# Patient Record
Sex: FEMALE | Race: WHITE | Employment: FULL TIME | URBAN - METROPOLITAN AREA
[De-identification: names, ages, dates, MRNs, and addresses within clinical notes are randomized per-mention and may not be internally consistent; named-entity substitution may affect disease eponyms.]

---

## 2019-05-12 ENCOUNTER — APPOINTMENT (OUTPATIENT)
Dept: GENERAL RADIOLOGY | Age: 49
End: 2019-05-12

## 2019-05-12 ENCOUNTER — APPOINTMENT (OUTPATIENT)
Dept: CT IMAGING | Age: 49
End: 2019-05-12

## 2019-05-12 ENCOUNTER — HOSPITAL ENCOUNTER (EMERGENCY)
Age: 49
Discharge: HOME | End: 2019-05-13
Attending: EMERGENCY MEDICINE
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
## 2019-05-12 DIAGNOSIS — S30.0XXA CONTUSION OF SACRUM, INITIAL ENCOUNTER: ICD-10-CM

## 2019-05-12 LAB
A/G RATIO: 1.1 (ref 1.1–2.2)
ALBUMIN SERPL-MCNC: 3.9 G/DL (ref 3.4–5)
ALP BLD-CCNC: 72 U/L (ref 40–129)
ALT SERPL-CCNC: 15 U/L (ref 10–40)
ANION GAP SERPL CALCULATED.3IONS-SCNC: 10 MMOL/L (ref 3–16)
AST SERPL-CCNC: 12 U/L (ref 15–37)
BASOPHILS ABSOLUTE: 0.1 K/UL (ref 0–0.2)
BASOPHILS RELATIVE PERCENT: 0.8 %
BILIRUB SERPL-MCNC: <0.2 MG/DL (ref 0–1)
BILIRUBIN URINE: NEGATIVE
BLOOD, URINE: ABNORMAL
BUN BLDV-MCNC: 12 MG/DL (ref 7–20)
CALCIUM SERPL-MCNC: 9.7 MG/DL (ref 8.3–10.6)
CHLORIDE BLD-SCNC: 96 MMOL/L (ref 99–110)
CLARITY: CLEAR
CO2: 28 MMOL/L (ref 21–32)
COLOR: YELLOW
CREAT SERPL-MCNC: 0.9 MG/DL (ref 0.6–1.1)
EOSINOPHILS ABSOLUTE: 0.2 K/UL (ref 0–0.6)
EOSINOPHILS RELATIVE PERCENT: 1.6 %
EPITHELIAL CELLS, UA: ABNORMAL /HPF
GFR AFRICAN AMERICAN: >60
GFR NON-AFRICAN AMERICAN: >60
GLOBULIN: 3.4 G/DL
GLUCOSE BLD-MCNC: 89 MG/DL (ref 70–99)
GLUCOSE URINE: NEGATIVE MG/DL
HCG QUALITATIVE: NEGATIVE
HCT VFR BLD CALC: 45.6 % (ref 36–48)
HEMOGLOBIN: 15.5 G/DL (ref 12–16)
KETONES, URINE: NEGATIVE MG/DL
LEUKOCYTE ESTERASE, URINE: NEGATIVE
LIPASE: 39 U/L (ref 13–60)
LYMPHOCYTES ABSOLUTE: 4.1 K/UL (ref 1–5.1)
LYMPHOCYTES RELATIVE PERCENT: 37.3 %
MCH RBC QN AUTO: 29.7 PG (ref 26–34)
MCHC RBC AUTO-ENTMCNC: 34 G/DL (ref 31–36)
MCV RBC AUTO: 87.4 FL (ref 80–100)
MICROSCOPIC EXAMINATION: YES
MONOCYTES ABSOLUTE: 0.8 K/UL (ref 0–1.3)
MONOCYTES RELATIVE PERCENT: 7.3 %
NEUTROPHILS ABSOLUTE: 5.8 K/UL (ref 1.7–7.7)
NEUTROPHILS RELATIVE PERCENT: 53 %
NITRITE, URINE: NEGATIVE
PDW BLD-RTO: 14.8 % (ref 12.4–15.4)
PH UA: 7 (ref 5–8)
PLATELET # BLD: 252 K/UL (ref 135–450)
PMV BLD AUTO: 7.8 FL (ref 5–10.5)
POTASSIUM SERPL-SCNC: 4 MMOL/L (ref 3.5–5.1)
PROTEIN UA: NEGATIVE MG/DL
RBC # BLD: 5.22 M/UL (ref 4–5.2)
RBC UA: ABNORMAL /HPF (ref 0–2)
SODIUM BLD-SCNC: 134 MMOL/L (ref 136–145)
SPECIFIC GRAVITY UA: <=1.005 (ref 1–1.03)
TOTAL PROTEIN: 7.3 G/DL (ref 6.4–8.2)
URINE TYPE: ABNORMAL
UROBILINOGEN, URINE: 0.2 E.U./DL
WBC # BLD: 10.9 K/UL (ref 4–11)
WBC UA: ABNORMAL /HPF (ref 0–5)

## 2019-05-12 PROCEDURE — 36415 COLL VENOUS BLD VENIPUNCTURE: CPT

## 2019-05-12 PROCEDURE — 2580000003 HC RX 258: Performed by: EMERGENCY MEDICINE

## 2019-05-12 PROCEDURE — 6360000004 HC RX CONTRAST MEDICATION: Performed by: EMERGENCY MEDICINE

## 2019-05-12 PROCEDURE — 85025 COMPLETE CBC W/AUTO DIFF WBC: CPT

## 2019-05-12 PROCEDURE — 84703 CHORIONIC GONADOTROPIN ASSAY: CPT

## 2019-05-12 PROCEDURE — 99284 EMERGENCY DEPT VISIT MOD MDM: CPT

## 2019-05-12 PROCEDURE — 72100 X-RAY EXAM L-S SPINE 2/3 VWS: CPT

## 2019-05-12 PROCEDURE — 71260 CT THORAX DX C+: CPT

## 2019-05-12 PROCEDURE — 74177 CT ABD & PELVIS W/CONTRAST: CPT

## 2019-05-12 PROCEDURE — 81001 URINALYSIS AUTO W/SCOPE: CPT

## 2019-05-12 PROCEDURE — 72220 X-RAY EXAM SACRUM TAILBONE: CPT

## 2019-05-12 PROCEDURE — 80053 COMPREHEN METABOLIC PANEL: CPT

## 2019-05-12 PROCEDURE — 83690 ASSAY OF LIPASE: CPT

## 2019-05-12 PROCEDURE — 6370000000 HC RX 637 (ALT 250 FOR IP): Performed by: NURSE PRACTITIONER

## 2019-05-12 RX ORDER — 0.9 % SODIUM CHLORIDE 0.9 %
1000 INTRAVENOUS SOLUTION INTRAVENOUS ONCE
Status: COMPLETED | OUTPATIENT
Start: 2019-05-12 | End: 2019-05-12

## 2019-05-12 RX ORDER — HYDROCODONE BITARTRATE AND ACETAMINOPHEN 7.5; 325 MG/1; MG/1
1 TABLET ORAL ONCE
Status: COMPLETED | OUTPATIENT
Start: 2019-05-12 | End: 2019-05-12

## 2019-05-12 RX ADMIN — HYDROCODONE BITARTRATE AND ACETAMINOPHEN 1 TABLET: 7.5; 325 TABLET ORAL at 21:44

## 2019-05-12 RX ADMIN — SODIUM CHLORIDE 1000 ML: 9 INJECTION, SOLUTION INTRAVENOUS at 21:58

## 2019-05-12 RX ADMIN — IOPAMIDOL 100 ML: 755 INJECTION, SOLUTION INTRAVENOUS at 23:18

## 2019-05-12 SDOH — HEALTH STABILITY: MENTAL HEALTH: HOW OFTEN DO YOU HAVE A DRINK CONTAINING ALCOHOL?: NEVER

## 2019-05-12 ASSESSMENT — PAIN DESCRIPTION - LOCATION: LOCATION: RIB CAGE

## 2019-05-12 ASSESSMENT — PAIN SCALES - GENERAL
PAINLEVEL_OUTOF10: 9
PAINLEVEL_OUTOF10: 9
PAINLEVEL_OUTOF10: 5

## 2019-05-12 ASSESSMENT — PAIN DESCRIPTION - PAIN TYPE: TYPE: ACUTE PAIN

## 2019-05-12 ASSESSMENT — PAIN DESCRIPTION - ORIENTATION: ORIENTATION: LEFT

## 2019-05-13 VITALS
HEIGHT: 63 IN | WEIGHT: 215 LBS | SYSTOLIC BLOOD PRESSURE: 145 MMHG | RESPIRATION RATE: 18 BRPM | HEART RATE: 89 BPM | TEMPERATURE: 97.9 F | DIASTOLIC BLOOD PRESSURE: 85 MMHG | OXYGEN SATURATION: 96 % | BODY MASS INDEX: 38.09 KG/M2

## 2019-05-13 RX ORDER — IBUPROFEN 800 MG/1
800 TABLET ORAL 2 TIMES DAILY PRN
Qty: 60 TABLET | Refills: 0 | Status: SHIPPED | OUTPATIENT
Start: 2019-05-13 | End: 2020-04-07

## 2019-05-13 NOTE — ED NOTES
Patient back from CT scan. Tolerated well. Ambulated there and back without difficulty.       Smith Gonzalez RN  05/12/19 1928

## 2019-05-13 NOTE — ED PROVIDER NOTES
Abdomen: Nondistended soft tenderness in the left lower and upper quadrant. No ecchymosis, erythema, edema. No rigidity. EXTREMITIES: MAEE. No acute deformities. SKIN: Warm and dry. NEUROLOGICAL: Alert and oriented. Strength is 5/5 in all extremities and sensation is intact. BACK: On exam of cervical, thoracic, lumbar spine, there is no swelling. There is ecchymosis bilaterally to her upper buttocks. There is no midline bony tenderness, without crepitus, deformity, or step off. Patient exhibits tenderness of paraspinal musculature to right and left of midline of the lumbar region. There is no point tenderness over the SI Joint. Patellar reflexes +2. Distal pulses intact. Cap refill < 2 seconds. Sensation intact. Neurovascularly intact. HSNE spine intact. RADIOLOGY  Xr Lumbar Spine (2-3 Views)    Result Date: 5/12/2019  EXAMINATION: 3 XRAY VIEWS OF THE LUMBAR SPINE 5/12/2019 9:33 pm COMPARISON: None. HISTORY: ORDERING SYSTEM PROVIDED HISTORY: Injury TECHNOLOGIST PROVIDED HISTORY: Reason for exam:->Injury Ordering Physician Provided Reason for Exam: Fall (fell down stairs on Thursday. 6 steps mechanical tailbone and left side pain ) FINDINGS: Bones: Five non-rib-bearing lumbar vertebral bodies are present. Vertebral body heights and alignment are maintained. Degenerative changes: No significant degenerative changes. Soft Tissues: Normal soft tissues. No acute lumbar spine abnormality     Xr Sacrum Coccyx (min 2 Views)    Result Date: 5/12/2019  EXAMINATION: 3 XRAY VIEWS OF THE SACRUM/COCCYX 5/12/2019 9:33 pm COMPARISON: Lumbar spine radiographs 05/12/2019, 06/23/2001 (report) HISTORY: ORDERING SYSTEM PROVIDED HISTORY: Injury TECHNOLOGIST PROVIDED HISTORY: Reason for exam:->Injury Ordering Physician Provided Reason for Exam: Fall (fell down stairs on Thursday. 6 steps mechanical tailbone and left side pain ) FINDINGS: Intact sacroiliac joints. Arcuate lines of the sacrum are smooth.   There is a step-off/discontinuity along the coccyx. Hips are intact and in anatomic alignment. Remainder of the bony pelvis is intact. Discontinuity/step-off along the coccyx on lateral view. Correlate with point tenderness. Remainder of the sacrum and sacroiliac joints are otherwise intact. Ct Abdomen Pelvis W Iv Contrast Additional Contrast? None    Negative for acute pulmonary embolism. No acute aortic dissection or aneurysm. Mosaic lung attenuation throughout all 5 lobes (unchanged since 2014) is favored to represent air trapping due to obstructive small airways disease given diffuse airway inflammation. Differential considerations included smoking-related airways disease and hypersensitivity pneumonitis. No acute abdominopelvic findings. No acute osseous abnormality throughout the imaged skeleton. Specifically, intact sacrum and coccyx. Indeterminate right renal cyst.  Recommend follow-up abdominal MRI with and without contrast in 6-12 months. RECOMMENDATIONS: Renal protocol MRI with and without contrast in 6-12 months. Ct Chest Pulmonary Embolism W Contrast    Negative for acute pulmonary embolism. No acute aortic dissection or aneurysm. Mosaic lung attenuation throughout all 5 lobes (unchanged since 2014) is favored to represent air trapping due to obstructive small airways disease given diffuse airway inflammation. Differential considerations included smoking-related airways disease and hypersensitivity pneumonitis. No acute abdominopelvic findings. No acute osseous abnormality throughout the imaged skeleton. Specifically, intact sacrum and coccyx. Indeterminate right renal cyst.  Recommend follow-up abdominal MRI with and without contrast in 6-12 months. RECOMMENDATIONS: Renal protocol MRI with and without contrast in 6-12 months. ED COURSE   I have seen this patient in collaboration with Imtiaz Muller. She presents emergency department for evaluation of coccyx pain after a fall.  CBC and CMP are unremarkable no leukocytosis, anemia, anemia, acute kidney injury. Urinalysis is negative for infection. CT of abdomen and pelvis and chest reveal negative for acute pulmonary embolism. No acute aortic dissection or aneurysm. No acute abdominopelvic findings. No acute osseous abnormality throughout the imaged skeleton. Specifically intact sacrum and coccyx. Patient given Orestes Markham in the emergency department for pain with good relief. I discussed all findings with patient. Patient instructed to follow-up with her primary care physician. Patient instructed to continue ibuprofen and Tylenol as needed for pain. Patient instructed to return to the emergency department with any new or worsening symptoms. Patient is agreeable with plan of care and denies any questions at this time. Patient was sent home with a prescription for ibuprofen.     MDM    Results for orders placed or performed during the hospital encounter of 05/12/19   CBC Auto Differential   Result Value Ref Range    WBC 10.9 4.0 - 11.0 K/uL    RBC 5.22 (H) 4.00 - 5.20 M/uL    Hemoglobin 15.5 12.0 - 16.0 g/dL    Hematocrit 45.6 36.0 - 48.0 %    MCV 87.4 80.0 - 100.0 fL    MCH 29.7 26.0 - 34.0 pg    MCHC 34.0 31.0 - 36.0 g/dL    RDW 14.8 12.4 - 15.4 %    Platelets 174 734 - 087 K/uL    MPV 7.8 5.0 - 10.5 fL    Neutrophils % 53.0 %    Lymphocytes % 37.3 %    Monocytes % 7.3 %    Eosinophils % 1.6 %    Basophils % 0.8 %    Neutrophils # 5.8 1.7 - 7.7 K/uL    Lymphocytes # 4.1 1.0 - 5.1 K/uL    Monocytes # 0.8 0.0 - 1.3 K/uL    Eosinophils # 0.2 0.0 - 0.6 K/uL    Basophils # 0.1 0.0 - 0.2 K/uL   Comprehensive Metabolic Panel   Result Value Ref Range    Sodium 134 (L) 136 - 145 mmol/L    Potassium 4.0 3.5 - 5.1 mmol/L    Chloride 96 (L) 99 - 110 mmol/L    CO2 28 21 - 32 mmol/L    Anion Gap 10 3 - 16    Glucose 89 70 - 99 mg/dL    BUN 12 7 - 20 mg/dL    CREATININE 0.9 0.6 - 1.1 mg/dL    GFR Non-African American >60 >60    GFR  >60 >60 pain, vomiting) that necessitate immediate return. Final Impression    1. Fall, initial encounter    2. Contusion of sacrum, initial encounter        Blood pressure (!) 145/85, pulse 89, temperature 97.9 °F (36.6 °C), temperature source Oral, resp. rate 18, height 5' 3\" (1.6 m), weight 215 lb (97.5 kg), last menstrual period 05/11/2019, SpO2 96 %. DISPOSITION  Patient was discharged to home in good condition.           [address] Jazmine Lopez, APRN - CNP  05/13/19 [id number]

## 2019-05-13 NOTE — ED PROVIDER NOTES
I independently performed a history and physical on Emma Ko. All diagnostic, treatment, and disposition decisions were made by myself in conjunction with the advanced practice provider. For further details of [facility] emergency department encounter, please see advanced practice provider's documentation    This is a 80-year-old female presenting for evaluation after she fell down about 6 steps on Thursday night. She is having some left upper abdominal pain and nausea since the fall. She also has pain in her lower back. Physical examination: Left upper abdominal tenderness to palpation. Xr Lumbar Spine (2-3 Views)    Result Date: 5/12/2019  EXAMINATION: 3 XRAY VIEWS OF THE LUMBAR SPINE 5/12/2019 9:33 pm COMPARISON: None. HISTORY: ORDERING SYSTEM PROVIDED HISTORY: Injury TECHNOLOGIST PROVIDED HISTORY: Reason for exam:->Injury Ordering Physician Provided Reason for Exam: Fall (fell down stairs on Thursday. 6 steps mechanical tailbone and left side pain ) FINDINGS: Bones: Five non-rib-bearing lumbar vertebral bodies are present. Vertebral body heights and alignment are maintained. Degenerative changes: No significant degenerative changes. Soft Tissues: Normal soft tissues. No acute lumbar spine abnormality     Xr Sacrum Coccyx (min 2 Views)    Result Date: 5/12/2019  EXAMINATION: 3 XRAY VIEWS OF THE SACRUM/COCCYX 5/12/2019 9:33 pm COMPARISON: Lumbar spine radiographs 05/12/2019, 06/23/2001 (report) HISTORY: ORDERING SYSTEM PROVIDED HISTORY: Injury TECHNOLOGIST PROVIDED HISTORY: Reason for exam:->Injury Ordering Physician Provided Reason for Exam: Fall (fell down stairs on Thursday. 6 steps mechanical tailbone and left side pain ) FINDINGS: Intact sacroiliac joints. Arcuate lines of the sacrum are smooth. There is a step-off/discontinuity along the coccyx. Hips are intact and in anatomic alignment. Remainder of the bony pelvis is intact.      Discontinuity/step-off along the coccyx on lateral view. Correlate with point tenderness. Remainder of the sacrum and sacroiliac joints are otherwise intact. Ct Abdomen Pelvis W Iv Contrast Additional Contrast? None    Negative for acute pulmonary embolism. No acute aortic dissection or aneurysm. Mosaic lung attenuation throughout all 5 lobes (unchanged since 2014) is favored to represent air trapping due to obstructive small airways disease given diffuse airway inflammation. Differential considerations included smoking-related airways disease and hypersensitivity pneumonitis. No acute abdominopelvic findings. No acute osseous abnormality throughout the imaged skeleton. Specifically, intact sacrum and coccyx. Indeterminate right renal cyst.  Recommend follow-up abdominal MRI with and without contrast in 6-12 months. RECOMMENDATIONS: Renal protocol MRI with and without contrast in 6-12 months. Ct Chest Pulmonary Embolism W Contrast    Negative for acute pulmonary embolism. No acute aortic dissection or aneurysm. Mosaic lung attenuation throughout all 5 lobes (unchanged since 2014) is favored to represent air trapping due to obstructive small airways disease given diffuse airway inflammation. Differential considerations included smoking-related airways disease and hypersensitivity pneumonitis. No acute abdominopelvic findings. No acute osseous abnormality throughout the imaged skeleton. Specifically, intact sacrum and coccyx. Indeterminate right renal cyst.  Recommend follow-up abdominal MRI with and without contrast in 6-12 months. RECOMMENDATIONS: Renal protocol MRI with and without contrast in 6-12 months.         Chris Santana, DO  05/13/19 5753

## 2020-04-07 ENCOUNTER — HOSPITAL ENCOUNTER (EMERGENCY)
Age: 50
Discharge: HOME | End: 2020-04-07
Attending: EMERGENCY MEDICINE
Payer: COMMERCIAL

## 2020-04-07 ENCOUNTER — APPOINTMENT (OUTPATIENT)
Dept: GENERAL RADIOLOGY | Age: 50
End: 2020-04-07
Payer: COMMERCIAL

## 2020-04-07 VITALS
RESPIRATION RATE: 18 BRPM | HEIGHT: 64 IN | SYSTOLIC BLOOD PRESSURE: 128 MMHG | OXYGEN SATURATION: 95 % | WEIGHT: 220 LBS | BODY MASS INDEX: 37.56 KG/M2 | TEMPERATURE: 98.2 F | DIASTOLIC BLOOD PRESSURE: 75 MMHG | HEART RATE: 69 BPM

## 2020-04-07 LAB
A/G RATIO: 1.1 (ref 1.1–2.2)
ALBUMIN SERPL-MCNC: 3.9 G/DL (ref 3.4–5)
ALP BLD-CCNC: 78 U/L (ref 40–129)
ALT SERPL-CCNC: 18 U/L (ref 10–40)
ANION GAP SERPL CALCULATED.3IONS-SCNC: 15 MMOL/L (ref 3–16)
AST SERPL-CCNC: 19 U/L (ref 15–37)
BASOPHILS ABSOLUTE: 0.1 K/UL (ref 0–0.2)
BASOPHILS RELATIVE PERCENT: 0.7 %
BILIRUB SERPL-MCNC: 0.3 MG/DL (ref 0–1)
BUN BLDV-MCNC: 13 MG/DL (ref 7–20)
CALCIUM SERPL-MCNC: 9.6 MG/DL (ref 8.3–10.6)
CHLORIDE BLD-SCNC: 98 MMOL/L (ref 99–110)
CO2: 21 MMOL/L (ref 21–32)
CREAT SERPL-MCNC: 0.6 MG/DL (ref 0.6–1.1)
D DIMER: <200 NG/ML DDU (ref 0–229)
EKG ATRIAL RATE: 75 BPM
EKG ATRIAL RATE: 95 BPM
EKG DIAGNOSIS: NORMAL
EKG DIAGNOSIS: NORMAL
EKG P AXIS: 39 DEGREES
EKG P AXIS: 9 DEGREES
EKG P-R INTERVAL: 146 MS
EKG P-R INTERVAL: 156 MS
EKG Q-T INTERVAL: 348 MS
EKG Q-T INTERVAL: 378 MS
EKG QRS DURATION: 82 MS
EKG QRS DURATION: 92 MS
EKG QTC CALCULATION (BAZETT): 422 MS
EKG QTC CALCULATION (BAZETT): 437 MS
EKG R AXIS: -32 DEGREES
EKG R AXIS: -49 DEGREES
EKG T AXIS: 54 DEGREES
EKG T AXIS: 71 DEGREES
EKG VENTRICULAR RATE: 75 BPM
EKG VENTRICULAR RATE: 95 BPM
EOSINOPHILS ABSOLUTE: 0.1 K/UL (ref 0–0.6)
EOSINOPHILS RELATIVE PERCENT: 1.4 %
GFR AFRICAN AMERICAN: >60
GFR NON-AFRICAN AMERICAN: >60
GLOBULIN: 3.5 G/DL
GLUCOSE BLD-MCNC: 137 MG/DL (ref 70–99)
HCT VFR BLD CALC: 44.9 % (ref 36–48)
HEMOGLOBIN: 14.9 G/DL (ref 12–16)
LIPASE: 30 U/L (ref 13–60)
LYMPHOCYTES ABSOLUTE: 3.1 K/UL (ref 1–5.1)
LYMPHOCYTES RELATIVE PERCENT: 29.2 %
MCH RBC QN AUTO: 27.6 PG (ref 26–34)
MCHC RBC AUTO-ENTMCNC: 33.1 G/DL (ref 31–36)
MCV RBC AUTO: 83.4 FL (ref 80–100)
MONOCYTES ABSOLUTE: 0.9 K/UL (ref 0–1.3)
MONOCYTES RELATIVE PERCENT: 8.1 %
NEUTROPHILS ABSOLUTE: 6.5 K/UL (ref 1.7–7.7)
NEUTROPHILS RELATIVE PERCENT: 60.6 %
PDW BLD-RTO: 17 % (ref 12.4–15.4)
PLATELET # BLD: 242 K/UL (ref 135–450)
PMV BLD AUTO: 7.5 FL (ref 5–10.5)
POTASSIUM SERPL-SCNC: 4.3 MMOL/L (ref 3.5–5.1)
RBC # BLD: 5.39 M/UL (ref 4–5.2)
SODIUM BLD-SCNC: 134 MMOL/L (ref 136–145)
TOTAL PROTEIN: 7.4 G/DL (ref 6.4–8.2)
TROPONIN: <0.01 NG/ML
TROPONIN: <0.01 NG/ML
WBC # BLD: 10.6 K/UL (ref 4–11)

## 2020-04-07 PROCEDURE — 99285 EMERGENCY DEPT VISIT HI MDM: CPT

## 2020-04-07 PROCEDURE — 80053 COMPREHEN METABOLIC PANEL: CPT

## 2020-04-07 PROCEDURE — 85025 COMPLETE CBC W/AUTO DIFF WBC: CPT

## 2020-04-07 PROCEDURE — 85379 FIBRIN DEGRADATION QUANT: CPT

## 2020-04-07 PROCEDURE — 83690 ASSAY OF LIPASE: CPT

## 2020-04-07 PROCEDURE — 71046 X-RAY EXAM CHEST 2 VIEWS: CPT

## 2020-04-07 PROCEDURE — 93010 ELECTROCARDIOGRAM REPORT: CPT | Performed by: INTERNAL MEDICINE

## 2020-04-07 PROCEDURE — 84484 ASSAY OF TROPONIN QUANT: CPT

## 2020-04-07 PROCEDURE — 93005 ELECTROCARDIOGRAM TRACING: CPT | Performed by: EMERGENCY MEDICINE

## 2020-04-07 PROCEDURE — 36415 COLL VENOUS BLD VENIPUNCTURE: CPT

## 2020-04-07 ASSESSMENT — PAIN DESCRIPTION - FREQUENCY: FREQUENCY: INTERMITTENT

## 2020-04-07 ASSESSMENT — PAIN DESCRIPTION - PROGRESSION: CLINICAL_PROGRESSION: RESOLVED

## 2020-04-07 ASSESSMENT — PAIN DESCRIPTION - DESCRIPTORS: DESCRIPTORS: ACHING

## 2020-04-07 ASSESSMENT — ENCOUNTER SYMPTOMS
ABDOMINAL PAIN: 0
SHORTNESS OF BREATH: 0
VOMITING: 0

## 2020-04-07 ASSESSMENT — PAIN SCALES - GENERAL: PAINLEVEL_OUTOF10: 3

## 2020-04-08 ENCOUNTER — OFFICE VISIT (OUTPATIENT)
Dept: CARDIOLOGY CLINIC | Age: 50
End: 2020-04-08
Payer: COMMERCIAL

## 2020-04-08 ENCOUNTER — TELEPHONE (OUTPATIENT)
Dept: CARDIOLOGY CLINIC | Age: 50
End: 2020-04-08

## 2020-04-08 ENCOUNTER — CARE COORDINATION (OUTPATIENT)
Dept: CARE COORDINATION | Age: 50
End: 2020-04-08

## 2020-04-08 VITALS
DIASTOLIC BLOOD PRESSURE: 62 MMHG | WEIGHT: 230 LBS | OXYGEN SATURATION: 96 % | HEIGHT: 63 IN | SYSTOLIC BLOOD PRESSURE: 130 MMHG | HEART RATE: 95 BPM | BODY MASS INDEX: 40.75 KG/M2

## 2020-04-08 PROBLEM — R07.89 OTHER CHEST PAIN: Status: ACTIVE | Noted: 2020-04-08

## 2020-04-08 PROBLEM — R06.02 SOB (SHORTNESS OF BREATH): Status: ACTIVE | Noted: 2020-04-08

## 2020-04-08 PROBLEM — I10 ESSENTIAL HYPERTENSION: Status: ACTIVE | Noted: 2020-04-08

## 2020-04-08 PROCEDURE — 99204 OFFICE O/P NEW MOD 45 MIN: CPT | Performed by: INTERNAL MEDICINE

## 2020-04-08 NOTE — PATIENT INSTRUCTIONS
Patient Plan:  1. Recommend checking your blood pressure twice a day. (noon and evening)  Sit, relax for 5 minutes or so and take your blood pressure. Keep a log of your numbers. 2. Echocardiogram to view size and strength of the heart   3. Stress Test to risk stratify  4. We will call you with the results of the echocardiogram  5. Smoking cessation discussed  6.  Virtual Visit in 1 month

## 2020-04-16 ENCOUNTER — HOSPITAL ENCOUNTER (OUTPATIENT)
Dept: NON INVASIVE DIAGNOSTICS | Age: 50
Discharge: HOME | End: 2020-04-16
Payer: COMMERCIAL

## 2020-04-16 LAB
LV EF: 55 %
LVEF MODALITY: NORMAL

## 2020-04-16 PROCEDURE — 93017 CV STRESS TEST TRACING ONLY: CPT

## 2020-04-16 PROCEDURE — 93306 TTE W/DOPPLER COMPLETE: CPT

## 2020-05-07 ENCOUNTER — TELEPHONE (OUTPATIENT)
Dept: CARDIOLOGY CLINIC | Age: 50
End: 2020-05-07

## 2020-05-07 NOTE — TELEPHONE ENCOUNTER
Spoke with patient. Voiced understanding. Will proceed with a cardiac CT. Order placed and [place] will call to schedule.

## 2020-05-07 NOTE — TELEPHONE ENCOUNTER
----- Message from Arlette Coates sent at 4/17/2020  3:03 PM EDT -----  Wanted RN to call since further testing is required. ----- Message -----  From: Angela Haas MD  Sent: 4/17/2020   7:45 AM EDT  To: [facility]    Let pt know that stress test showed no ischemia but technically incomplete as did not hit target HR (mostly due to HTN that occurred and limited study)/ I would get a cardiac CTA to eval further.

## 2020-05-12 NOTE — PROGRESS NOTES
[address] [facility]  VIRTUAL VISIT  (During RFGOQ-15 public health emergency)  Cardiovascular Office Note        PATIENT: Silke Rivera  DATE: 2020  MRN: <[id number]>  CSN: [id number]  : 1970      Primary Care Doctor: No primary care provider on file. Reason for evaluation:   1 Month Follow-Up      Subjective:   History of present illness on initial date of evaluation:   Silke Rivera is a 52 y.o. patient who presented referred by [address] [place] [address] ED with c/o of chest pain. Patient presented to the ED on 2020 with c/o chest pain. EKG demonstrated NSR, HR 75, left axis deviation. Troponin's negative x 2. Today she reports she has been having spikes in her BP. She states yesterday it was 179/112 and proceeded to the ED. She is not sure why her BP is up, other than being stressed due to the COVID-19. She states she has been eating better since the COVID-19. She states the chest pain started yesterday along with the high BP. She states the pain is left side, sharp and heavy. She states she broke out in a sweat and was SOB. She states she notices caffeine intensifies her symptoms. She states she has cut back on coffee. She states position, coughing, sneezing does not make her symptoms better or worse. She states she does smoke. She states she notices some swelling in her hands and feet at times. Today she is participating in a virtual visit. Echo 20 demonstrated EF 55%, no wall motion abnormalities. Stress test demonstrated no ischemia, but technically incomplete, did not hit target HR. She reports feeling good. She states the chest pain is not as often. She states her stress level is still elevated. She states she checks her BP and HR at home. She states her HR usually runs 80's to low 90's.         Patient Active Problem List   Diagnosis    Asthma    Essential hypertension    Other chest pain    SOB (shortness of breath)         Past Medical History:   has a past medical history of Asthma and Unspecified cerebral artery occlusion with cerebral infarction. Surgical History:   has a past surgical history that includes Tubal ligation and Tonsillectomy. Social History:   reports that she has been smoking. She has been smoking about 1.00 pack per day. She has never used smokeless tobacco. She reports that she does not drink alcohol or use drugs. Family History:  No evidence for sudden cardiac death or premature CAD    Home Medications:  Reviewed and are listed in nursing record. and/or listed below  No current outpatient medications on file. No current facility-administered medications for this visit. Allergies:  Patient has no known allergies. Review of Systems:   A 14 point review of symptoms completed. Pertinent positives identified in the HPI, all other review of symptoms negative as below.     Objective:   PHYSICAL EXAM:      Vitals based on last recorded  /86 (05/14/20 0841)    Temp      Pulse 122 (05/14/20 0841)   Resp      SpO2         Wt Readings from Last 3 Encounters:   05/14/20 233 lb 4 oz (105.8 kg)   04/08/20 230 lb (104.3 kg)   04/07/20 220 lb (99.8 kg)       Pt supplied vitals:  None      General Appearance:  Alert, cooperative, no distress, appears stated age   Head:  Normocephalic, atraumatic   Eyes:  No scleral icterus, conjunctiva/corneas clear   Nose: Nares normal, no drainage or sinus tenderness   Throat: Lips and tongue normal   Neck: Supple, symmetrical, trachea midline, No obvious JVD   Lungs:   No tachypnea respirations unlabored, No obvious wheeze/stridor   Chest Wall:  No noticeable deformity   Heart:  Unable to assess visually   Abdomen:   N0 distension   Extremities: Extremities normal, atraumatic, no cyanosis or edema   Pulses: Unable to assess visually   Skin: Skin color, normal, no noticeable rashes or lesions   Pysch: Normal mood and affect   Neurologic: Normal gross motor           LABS   CBC:      Lab Results

## 2020-05-14 ENCOUNTER — VIRTUAL VISIT (OUTPATIENT)
Dept: CARDIOLOGY CLINIC | Age: 50
End: 2020-05-14
Payer: COMMERCIAL

## 2020-05-14 VITALS
DIASTOLIC BLOOD PRESSURE: 86 MMHG | BODY MASS INDEX: 41.33 KG/M2 | HEIGHT: 63 IN | WEIGHT: 233.25 LBS | SYSTOLIC BLOOD PRESSURE: 135 MMHG | HEART RATE: 122 BPM

## 2020-05-14 PROCEDURE — 99214 OFFICE O/P EST MOD 30 MIN: CPT | Performed by: INTERNAL MEDICINE

## 2020-05-14 RX ORDER — METOPROLOL TARTRATE 50 MG/1
TABLET, FILM COATED ORAL
Qty: 2 TABLET | Refills: 0 | Status: SHIPPED | OUTPATIENT
Start: 2020-05-14

## 2020-05-14 NOTE — PATIENT INSTRUCTIONS
Patient Plan:  1. Cardiac CTA to risky stratify  2. Metoprolol 50 mg. Take 1 tablet 1 hour prior to the test.   3. Goal BP is 130/80 or less  4. Continue all other medications  5.  We will call you with the results of the CT

## 2023-08-01 ENCOUNTER — HOSPITAL ENCOUNTER (OUTPATIENT)
Dept: GENERAL RADIOLOGY | Age: 53
Discharge: HOME | End: 2023-08-01
Payer: COMMERCIAL

## 2023-08-01 ENCOUNTER — HOSPITAL ENCOUNTER (OUTPATIENT)
Age: 53
Discharge: HOME | End: 2023-08-01
Payer: COMMERCIAL

## 2023-08-01 DIAGNOSIS — M25.551 ACUTE HIP PAIN, RIGHT: ICD-10-CM

## 2023-08-01 PROCEDURE — 73502 X-RAY EXAM HIP UNI 2-3 VIEWS: CPT

## 2023-10-03 ENCOUNTER — HOSPITAL ENCOUNTER (OUTPATIENT)
Dept: VASCULAR LAB | Age: 53
Discharge: HOME | End: 2023-10-03
Payer: COMMERCIAL

## 2023-10-03 DIAGNOSIS — I73.9 PAD (PERIPHERAL ARTERY DISEASE) (HCC): ICD-10-CM

## 2023-10-03 PROCEDURE — 93926 LOWER EXTREMITY STUDY: CPT

## 2023-10-10 ENCOUNTER — HOSPITAL ENCOUNTER (OUTPATIENT)
Dept: MAMMOGRAPHY | Age: 53
Discharge: HOME | End: 2023-10-10
Payer: COMMERCIAL

## 2023-10-10 VITALS — HEIGHT: 63 IN | BODY MASS INDEX: 42.7 KG/M2 | WEIGHT: 241 LBS

## 2023-10-10 DIAGNOSIS — Z12.31 VISIT FOR SCREENING MAMMOGRAM: ICD-10-CM

## 2023-10-10 PROCEDURE — 77063 BREAST TOMOSYNTHESIS BI: CPT

## 2023-11-21 ENCOUNTER — TELEPHONE (OUTPATIENT)
Dept: VASCULAR SURGERY | Age: 53
End: 2023-11-21

## 2023-11-21 DIAGNOSIS — I73.9 PERIPHERAL VASCULAR DISEASE (HCC): Primary | ICD-10-CM

## 2023-11-21 NOTE — TELEPHONE ENCOUNTER
Called patient regarding scheduling cta abdominal aorta with clarence le runoffs. She is still sick and will call scheduling herself to schedule as soon as she is feeling better. 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 She will schedule it and let me know so can schedule apt with Dr Sam Garcia.  Pamtiffanie